# Patient Record
Sex: FEMALE
[De-identification: names, ages, dates, MRNs, and addresses within clinical notes are randomized per-mention and may not be internally consistent; named-entity substitution may affect disease eponyms.]

---

## 2021-01-26 ENCOUNTER — APPOINTMENT (OUTPATIENT)
Dept: INTERNAL MEDICINE | Facility: CLINIC | Age: 46
End: 2021-01-26
Payer: MEDICAID

## 2021-01-26 ENCOUNTER — NON-APPOINTMENT (OUTPATIENT)
Age: 46
End: 2021-01-26

## 2021-01-26 VITALS
TEMPERATURE: 98.4 F | WEIGHT: 122 LBS | DIASTOLIC BLOOD PRESSURE: 74 MMHG | SYSTOLIC BLOOD PRESSURE: 113 MMHG | RESPIRATION RATE: 15 BRPM | BODY MASS INDEX: 20.83 KG/M2 | OXYGEN SATURATION: 98 % | HEIGHT: 64 IN | HEART RATE: 78 BPM

## 2021-01-26 DIAGNOSIS — F17.200 NICOTINE DEPENDENCE, UNSPECIFIED, UNCOMPLICATED: ICD-10-CM

## 2021-01-26 PROCEDURE — 99396 PREV VISIT EST AGE 40-64: CPT | Mod: 25

## 2021-01-26 PROCEDURE — 93000 ELECTROCARDIOGRAM COMPLETE: CPT

## 2021-01-26 PROCEDURE — 99072 ADDL SUPL MATRL&STAF TM PHE: CPT

## 2021-01-27 PROBLEM — F17.200 CURRENT EVERY DAY SMOKER: Status: ACTIVE | Noted: 2021-01-26

## 2021-01-27 NOTE — HEALTH RISK ASSESSMENT
[Good] : ~his/her~  mood as  good [] : Yes [Yes] : Yes [2 - 4 times a month (2 pts)] : 2-4 times a month (2 points) [1 or 2 (0 pts)] : 1 or 2 (0 points) [Never (0 pts)] : Never (0 points) [No] : In the past 12 months have you used drugs other than those required for medical reasons? No [No falls in past year] : Patient reported no falls in the past year [0] : 2) Feeling down, depressed, or hopeless: Not at all (0) [Patient reported mammogram was normal] : Patient reported mammogram was normal [Patient reported PAP Smear was normal] : Patient reported PAP Smear was normal [de-identified] : one PPD smoker [de-identified] : Social alcohol use [Audit-CScore] : 2 [de-identified] : lightly active [de-identified] : regular [[id number]] : 0 [MammogramDate] : 02/2020 [MammogramComments] : Mammogram done in [place] [PapSmearDate] : 2020

## 2021-01-27 NOTE — PLAN
[FreeTextEntry1] : Blood tests sent to the lab\par \par EKG\par \par Patient had mammogram done in [place]

## 2021-01-27 NOTE — HISTORY OF PRESENT ILLNESS
[FreeTextEntry1] : Physical examination  [de-identified] : GAMALIEL KRAUSE is a 45 year old female patient who presents today for annual physical examination. She is feeling well and offers no complaints.

## 2021-01-27 NOTE — END OF VISIT
[FreeTextEntry3] : I, Pat Lucas, personally transcribed these services in the presence of Dr. Isabella Sevilla MD. I, Dr. Isabella Sevilla MD, personally performed the services described in this documentation as scribed by Pat Lucas in my presence and it is both accurate and complete.

## 2021-01-27 NOTE — PHYSICAL EXAM
[No Acute Distress] : no acute distress [Well Nourished] : well nourished [Well Developed] : well developed [Well-Appearing] : well-appearing [Normal Sclera/Conjunctiva] : normal sclera/conjunctiva [PERRL] : pupils equal round and reactive to light [EOMI] : extraocular movements intact [Normal Outer Ear/Nose] : the outer ears and nose were normal in appearance [Normal Oropharynx] : the oropharynx was normal [No JVD] : no jugular venous distention [No Lymphadenopathy] : no lymphadenopathy [Supple] : supple [Thyroid Normal, No Nodules] : the thyroid was normal and there were no nodules present [No Respiratory Distress] : no respiratory distress  [No Accessory Muscle Use] : no accessory muscle use [Clear to Auscultation] : lungs were clear to auscultation bilaterally [Normal Rate] : normal rate  [Regular Rhythm] : with a regular rhythm [Normal S1, S2] : normal S1 and S2 [No Murmur] : no murmur heard [No Carotid Bruits] : no carotid bruits [No Abdominal Bruit] : a ~M bruit was not heard ~T in the abdomen [No Varicosities] : no varicosities [Pedal Pulses Present] : the pedal pulses are present [No Edema] : there was no peripheral edema [No Palpable Aorta] : no palpable aorta [No Extremity Clubbing/Cyanosis] : no extremity clubbing/cyanosis [Normal Appearance] : normal in appearance [No Nipple Discharge] : no nipple discharge [No Axillary Lymphadenopathy] : no axillary lymphadenopathy [Soft] : abdomen soft [Non Tender] : non-tender [Non-distended] : non-distended [No Masses] : no abdominal mass palpated [No HSM] : no HSM [Normal Bowel Sounds] : normal bowel sounds [Normal Posterior Cervical Nodes] : no posterior cervical lymphadenopathy [Normal Anterior Cervical Nodes] : no anterior cervical lymphadenopathy [No CVA Tenderness] : no CVA  tenderness [No Spinal Tenderness] : no spinal tenderness [No Joint Swelling] : no joint swelling [Grossly Normal Strength/Tone] : grossly normal strength/tone [No Rash] : no rash [Coordination Grossly Intact] : coordination grossly intact [No Focal Deficits] : no focal deficits [Normal Gait] : normal gait [Deep Tendon Reflexes (DTR)] : deep tendon reflexes were 2+ and symmetric [Normal Affect] : the affect was normal [Normal Insight/Judgement] : insight and judgment were intact [FreeTextEntry1] : n/a

## 2021-02-01 LAB
25(OH)D3 SERPL-MCNC: 15.1 NG/ML
ALBUMIN SERPL ELPH-MCNC: 4.8 G/DL
ALP BLD-CCNC: 70 U/L
ALT SERPL-CCNC: 12 U/L
ANION GAP SERPL CALC-SCNC: 13 MMOL/L
APPEARANCE: CLEAR
AST SERPL-CCNC: 17 U/L
BACTERIA: NEGATIVE
BASOPHILS # BLD AUTO: 0.06 K/UL
BASOPHILS NFR BLD AUTO: 0.8 %
BILIRUB SERPL-MCNC: 0.8 MG/DL
BILIRUBIN URINE: NEGATIVE
BLOOD URINE: ABNORMAL
BUN SERPL-MCNC: 13 MG/DL
CALCIUM SERPL-MCNC: 9.8 MG/DL
CHLORIDE SERPL-SCNC: 105 MMOL/L
CO2 SERPL-SCNC: 26 MMOL/L
COLOR: YELLOW
CREAT SERPL-MCNC: 0.65 MG/DL
EOSINOPHIL # BLD AUTO: 0.14 K/UL
EOSINOPHIL NFR BLD AUTO: 1.9 %
GLUCOSE QUALITATIVE U: NEGATIVE
GLUCOSE SERPL-MCNC: 70 MG/DL
HCT VFR BLD CALC: 36.2 %
HGB BLD-MCNC: 11.1 G/DL
HYALINE CASTS: 1 /LPF
IMM GRANULOCYTES NFR BLD AUTO: 0.3 %
KETONES URINE: NEGATIVE
LEUKOCYTE ESTERASE URINE: NEGATIVE
LYMPHOCYTES # BLD AUTO: 1.9 K/UL
LYMPHOCYTES NFR BLD AUTO: 26.2 %
MAN DIFF?: NORMAL
MCHC RBC-ENTMCNC: 22.6 PG
MCHC RBC-ENTMCNC: 30.7 GM/DL
MCV RBC AUTO: 73.7 FL
MICROSCOPIC-UA: NORMAL
MONOCYTES # BLD AUTO: 0.63 K/UL
MONOCYTES NFR BLD AUTO: 8.7 %
NEUTROPHILS # BLD AUTO: 4.5 K/UL
NEUTROPHILS NFR BLD AUTO: 62.1 %
NITRITE URINE: NEGATIVE
PH URINE: 7
PLATELET # BLD AUTO: 334 K/UL
POTASSIUM SERPL-SCNC: 4.4 MMOL/L
PROT SERPL-MCNC: 7.1 G/DL
PROTEIN URINE: NORMAL
RBC # BLD: 4.91 M/UL
RBC # FLD: 16.3 %
RED BLOOD CELLS URINE: 9 /HPF
SODIUM SERPL-SCNC: 144 MMOL/L
SPECIFIC GRAVITY URINE: 1.02
SQUAMOUS EPITHELIAL CELLS: 3 /HPF
T3 SERPL-MCNC: 99 NG/DL
T4 FREE SERPL-MCNC: 1.1 NG/DL
T4 SERPL-MCNC: 6.5 UG/DL
TSH SERPL-ACNC: 1.46 UIU/ML
UROBILINOGEN URINE: NORMAL
WBC # FLD AUTO: 7.25 K/UL
WHITE BLOOD CELLS URINE: 2 /HPF

## 2021-02-22 LAB
CHOLEST SERPL-MCNC: 225 MG/DL
HDLC SERPL-MCNC: 63 MG/DL
LDLC SERPL CALC-MCNC: 124 MG/DL
NONHDLC SERPL-MCNC: 162 MG/DL
TRIGL SERPL-MCNC: 192 MG/DL

## 2021-10-19 DIAGNOSIS — R92.2 INCONCLUSIVE MAMMOGRAM: ICD-10-CM

## 2021-11-08 ENCOUNTER — APPOINTMENT (OUTPATIENT)
Dept: INTERNAL MEDICINE | Facility: CLINIC | Age: 46
End: 2021-11-08
Payer: MEDICAID

## 2021-11-08 VITALS
WEIGHT: 128 LBS | HEIGHT: 64 IN | RESPIRATION RATE: 14 BRPM | TEMPERATURE: 99.1 F | DIASTOLIC BLOOD PRESSURE: 66 MMHG | HEART RATE: 62 BPM | SYSTOLIC BLOOD PRESSURE: 104 MMHG | BODY MASS INDEX: 21.85 KG/M2 | OXYGEN SATURATION: 98 %

## 2021-11-08 DIAGNOSIS — F17.200 NICOTINE DEPENDENCE, UNSPECIFIED, UNCOMPLICATED: ICD-10-CM

## 2021-11-08 PROCEDURE — 99213 OFFICE O/P EST LOW 20 MIN: CPT | Mod: 25

## 2021-11-08 PROCEDURE — 99072 ADDL SUPL MATRL&STAF TM PHE: CPT

## 2021-11-12 NOTE — HEALTH RISK ASSESSMENT
[] : Yes [Yes] : Yes [2 - 4 times a month (2 pts)] : 2-4 times a month (2 points) [1 or 2 (0 pts)] : 1 or 2 (0 points) [Never (0 pts)] : Never (0 points) [No] : In the past 12 months have you used drugs other than those required for medical reasons? No [No falls in past year] : Patient reported no falls in the past year [0] : 2) Feeling down, depressed, or hopeless: Not at all (0) [de-identified] : one PPD smoker [de-identified] : Social alcohol use [Audit-CScore] : 2 [de-identified] : lightly active [de-identified] : regular [[id number]] : 0

## 2021-11-12 NOTE — HISTORY OF PRESENT ILLNESS
[FreeTextEntry1] : Follow-up microscopic hematuria, anemia [de-identified] : GAMALIEL KRAUSE is a 46 year old female with a PMHx of vitamin D deficiency and anemia who presents today for follow-up. She is feeling okay and offers no specific complaints.

## 2021-11-12 NOTE — END OF VISIT
[FreeTextEntry3] : I, Joycelyn Guillen, personally transcribed these services in the presence of Dr. Isabella Sevilla MD. I, Dr. Isabella Sevilla MD, personally performed the services described in this documentation as scribed by Joycelyn Guillen in my presence and it is both accurate and complete.

## 2021-12-12 LAB
25(OH)D3 SERPL-MCNC: 23.8 NG/ML
ALBUMIN SERPL ELPH-MCNC: 5 G/DL
ALP BLD-CCNC: 73 U/L
ALT SERPL-CCNC: 11 U/L
ANION GAP SERPL CALC-SCNC: 10 MMOL/L
APPEARANCE: CLEAR
AST SERPL-CCNC: 18 U/L
BACTERIA UR CULT: NORMAL
BACTERIA: NEGATIVE
BILIRUB SERPL-MCNC: 0.9 MG/DL
BILIRUBIN URINE: NEGATIVE
BLOOD URINE: ABNORMAL
BUN SERPL-MCNC: 12 MG/DL
CALCIUM SERPL-MCNC: 10 MG/DL
CHLORIDE SERPL-SCNC: 104 MMOL/L
CHOLEST SERPL-MCNC: 239 MG/DL
CO2 SERPL-SCNC: 26 MMOL/L
COLOR: YELLOW
CREAT SERPL-MCNC: 0.59 MG/DL
GLUCOSE QUALITATIVE U: NEGATIVE
GLUCOSE SERPL-MCNC: 103 MG/DL
HDLC SERPL-MCNC: 80 MG/DL
HYALINE CASTS: 0 /LPF
KETONES URINE: NEGATIVE
LDLC SERPL CALC-MCNC: 138 MG/DL
LEUKOCYTE ESTERASE URINE: NEGATIVE
MICROSCOPIC-UA: NORMAL
NITRITE URINE: NEGATIVE
NONHDLC SERPL-MCNC: 159 MG/DL
PH URINE: 6.5
POTASSIUM SERPL-SCNC: 4 MMOL/L
PROT SERPL-MCNC: 7.7 G/DL
PROTEIN URINE: NEGATIVE
RED BLOOD CELLS URINE: 24 /HPF
SODIUM SERPL-SCNC: 140 MMOL/L
SPECIFIC GRAVITY URINE: 1.02
SQUAMOUS EPITHELIAL CELLS: 2 /HPF
T3 SERPL-MCNC: 102 NG/DL
T4 FREE SERPL-MCNC: 1.2 NG/DL
T4 SERPL-MCNC: 6.5 UG/DL
TRIGL SERPL-MCNC: 107 MG/DL
TSH SERPL-ACNC: 2.29 UIU/ML
URINE CYTOLOGY: NORMAL
UROBILINOGEN URINE: NORMAL
WHITE BLOOD CELLS URINE: 1 /HPF

## 2022-01-21 LAB
BASOPHILS # BLD AUTO: 0.06 K/UL
BASOPHILS NFR BLD AUTO: 0.8 %
EOSINOPHIL # BLD AUTO: 0.12 K/UL
EOSINOPHIL NFR BLD AUTO: 1.7 %
HCT VFR BLD CALC: 34.7 %
HGB BLD-MCNC: 10.6 G/DL
IMM GRANULOCYTES NFR BLD AUTO: 0.3 %
LYMPHOCYTES # BLD AUTO: 2.24 K/UL
LYMPHOCYTES NFR BLD AUTO: 31 %
MAN DIFF?: NORMAL
MCHC RBC-ENTMCNC: 21.9 PG
MCHC RBC-ENTMCNC: 30.5 GM/DL
MCV RBC AUTO: 71.8 FL
MONOCYTES # BLD AUTO: 0.63 K/UL
MONOCYTES NFR BLD AUTO: 8.7 %
NEUTROPHILS # BLD AUTO: 4.15 K/UL
NEUTROPHILS NFR BLD AUTO: 57.5 %
PLATELET # BLD AUTO: 327 K/UL
RBC # BLD: 4.83 M/UL
RBC # FLD: 15.9 %
WBC # FLD AUTO: 7.22 K/UL

## 2022-06-23 ENCOUNTER — APPOINTMENT (OUTPATIENT)
Dept: INTERNAL MEDICINE | Facility: CLINIC | Age: 47
End: 2022-06-23
Payer: MEDICAID

## 2022-06-23 ENCOUNTER — NON-APPOINTMENT (OUTPATIENT)
Age: 47
End: 2022-06-23

## 2022-06-23 VITALS
DIASTOLIC BLOOD PRESSURE: 80 MMHG | WEIGHT: 123 LBS | HEIGHT: 64 IN | OXYGEN SATURATION: 97 % | TEMPERATURE: 97.9 F | SYSTOLIC BLOOD PRESSURE: 122 MMHG | RESPIRATION RATE: 16 BRPM | BODY MASS INDEX: 21 KG/M2 | HEART RATE: 64 BPM

## 2022-06-23 DIAGNOSIS — R82.90 UNSPECIFIED ABNORMAL FINDINGS IN URINE: ICD-10-CM

## 2022-06-23 PROCEDURE — 93000 ELECTROCARDIOGRAM COMPLETE: CPT

## 2022-06-23 PROCEDURE — 99396 PREV VISIT EST AGE 40-64: CPT | Mod: 25

## 2022-06-24 ENCOUNTER — OUTPATIENT (OUTPATIENT)
Dept: OUTPATIENT SERVICES | Facility: HOSPITAL | Age: 47
LOS: 1 days | End: 2022-06-24
Payer: COMMERCIAL

## 2022-06-24 ENCOUNTER — APPOINTMENT (OUTPATIENT)
Dept: OBGYN | Facility: CLINIC | Age: 47
End: 2022-06-24
Payer: MEDICAID

## 2022-06-24 VITALS
BODY MASS INDEX: 21.51 KG/M2 | HEART RATE: 62 BPM | SYSTOLIC BLOOD PRESSURE: 97 MMHG | HEIGHT: 64 IN | OXYGEN SATURATION: 95 % | DIASTOLIC BLOOD PRESSURE: 62 MMHG | WEIGHT: 126 LBS | TEMPERATURE: 97 F | RESPIRATION RATE: 18 BRPM

## 2022-06-24 DIAGNOSIS — Z00.00 ENCOUNTER FOR GENERAL ADULT MEDICAL EXAMINATION WITHOUT ABNORMAL FINDINGS: ICD-10-CM

## 2022-06-24 DIAGNOSIS — Z01.419 ENCOUNTER FOR GYNECOLOGICAL EXAMINATION (GENERAL) (ROUTINE) W/OUT ABNORMAL FINDINGS: ICD-10-CM

## 2022-06-24 PROCEDURE — 99386 PREV VISIT NEW AGE 40-64: CPT

## 2022-06-24 PROCEDURE — 87491 CHLMYD TRACH DNA AMP PROBE: CPT

## 2022-06-24 PROCEDURE — 87591 N.GONORRHOEAE DNA AMP PROB: CPT

## 2022-06-24 PROCEDURE — 87625 HPV TYPES 16 & 18 ONLY: CPT

## 2022-06-24 PROCEDURE — G0463: CPT

## 2022-06-24 PROCEDURE — 87624 HPV HI-RISK TYP POOLED RSLT: CPT

## 2022-06-24 NOTE — HISTORY OF PRESENT ILLNESS
[Patient reported mammogram was normal] : Patient reported mammogram was normal [N] : Patient does not use contraception [Y] : Positive pregnancy history [FreeTextEntry1] : Patient is here for gyn check up\par No c/o\par \par \par LMP 22\par .\par last pap 2yrs ago.\par Mammo  nl per pt.\par Denies h/o gyn problems. [Mammogramdate] : 6/2022 [PapSmeardate] : 2 yrs ago as per pt [LMPDate] : 6/17/22 [PGxTotal] : 1 [PGHxFullTerm] : 0 [PGHxPremature] : 0 [PGHxAbortions] : 1 [[facility]] : 0 [PGHxABInduced] : 1 [PGHxABSpont] : 0 [PGHxEctopic] : 0 [PGHxMultBirths] : 0

## 2022-06-26 ENCOUNTER — NON-APPOINTMENT (OUTPATIENT)
Age: 47
End: 2022-06-26

## 2022-06-26 NOTE — PHYSICAL EXAM
[No Acute Distress] : no acute distress [Well Nourished] : well nourished [Well Developed] : well developed [Well-Appearing] : well-appearing [Normal Sclera/Conjunctiva] : normal sclera/conjunctiva [PERRL] : pupils equal round and reactive to light [EOMI] : extraocular movements intact [Normal Outer Ear/Nose] : the outer ears and nose were normal in appearance [Normal Oropharynx] : the oropharynx was normal [No JVD] : no jugular venous distention [No Lymphadenopathy] : no lymphadenopathy [Supple] : supple [Thyroid Normal, No Nodules] : the thyroid was normal and there were no nodules present [No Respiratory Distress] : no respiratory distress  [No Accessory Muscle Use] : no accessory muscle use [Clear to Auscultation] : lungs were clear to auscultation bilaterally [Normal Rate] : normal rate  [Regular Rhythm] : with a regular rhythm [Normal S1, S2] : normal S1 and S2 [No Murmur] : no murmur heard [No Carotid Bruits] : no carotid bruits [No Abdominal Bruit] : a ~M bruit was not heard ~T in the abdomen [No Varicosities] : no varicosities [Pedal Pulses Present] : the pedal pulses are present [No Edema] : there was no peripheral edema [No Palpable Aorta] : no palpable aorta [No Extremity Clubbing/Cyanosis] : no extremity clubbing/cyanosis [Soft] : abdomen soft [Non Tender] : non-tender [Non-distended] : non-distended [No HSM] : no HSM [Normal Bowel Sounds] : normal bowel sounds [Normal Posterior Cervical Nodes] : no posterior cervical lymphadenopathy [Normal Anterior Cervical Nodes] : no anterior cervical lymphadenopathy [No CVA Tenderness] : no CVA  tenderness [No Spinal Tenderness] : no spinal tenderness [No Joint Swelling] : no joint swelling [Grossly Normal Strength/Tone] : grossly normal strength/tone [No Rash] : no rash [Coordination Grossly Intact] : coordination grossly intact [No Focal Deficits] : no focal deficits [Normal Gait] : normal gait [Deep Tendon Reflexes (DTR)] : deep tendon reflexes were 2+ and symmetric [Normal Affect] : the affect was normal [Normal Insight/Judgement] : insight and judgment were intact [Normal Appearance] : normal in appearance [No Masses] : no palpable masses [No Nipple Discharge] : no nipple discharge [No Axillary Lymphadenopathy] : no axillary lymphadenopathy [FreeTextEntry1] : N/A

## 2022-06-26 NOTE — HISTORY OF PRESENT ILLNESS
[FreeTextEntry1] : Physical examination  [de-identified] : GAMALIEL KRAUSE is a 47 year old female with a PMHx of anemia, thalassemia trait, and vitamin D deficiency who presents today for physical

## 2022-06-26 NOTE — HEALTH RISK ASSESSMENT
[Good] : ~his/her~  mood as  good [Current] : Current [Yes] : Yes [2 - 4 times a month (2 pts)] : 2-4 times a month (2 points) [1 or 2 (0 pts)] : 1 or 2 (0 points) [Never (0 pts)] : Never (0 points) [No] : In the past 12 months have you used drugs other than those required for medical reasons? No [No falls in past year] : Patient reported no falls in the past year [0] : 2) Feeling down, depressed, or hopeless: Not at all (0) [de-identified] : one PPD smoker [de-identified] : Social alcohol use [Audit-CScore] : 2 [de-identified] : lightly active [de-identified] : regular [[id number]] : 0 [MammogramDate] : 6/20/2022 [PapSmearDate] : 2-3 years ago

## 2022-06-26 NOTE — END OF VISIT
[FreeTextEntry3] : I, Hilda Wright, personally transcribed these services in the presence of Dr. Isabella Sevilla MD. I, Dr. Isabella Sevilla MD, personally performed the services described in this documentation as scribed by Hilda Wright in my presence and it is both accurate and complete.

## 2022-06-27 DIAGNOSIS — Z01.419 ENCOUNTER FOR GYNECOLOGICAL EXAMINATION (GENERAL) (ROUTINE) WITHOUT ABNORMAL FINDINGS: ICD-10-CM

## 2022-06-30 LAB
C TRACH RRNA SPEC QL NAA+PROBE: NOT DETECTED
CYTOLOGY CVX/VAG DOC THIN PREP: NORMAL
HPV 16 E6+E7 MRNA CVX QL NAA+PROBE: NOT DETECTED
HPV HIGH+LOW RISK DNA PNL CVX: DETECTED
HPV18+45 E6+E7 MRNA CVX QL NAA+PROBE: NOT DETECTED
N GONORRHOEA RRNA SPEC QL NAA+PROBE: NOT DETECTED
SOURCE TP AMPLIFICATION: NORMAL

## 2022-07-07 ENCOUNTER — OUTPATIENT (OUTPATIENT)
Dept: OUTPATIENT SERVICES | Facility: HOSPITAL | Age: 47
LOS: 1 days | End: 2022-07-07
Payer: COMMERCIAL

## 2022-07-07 ENCOUNTER — APPOINTMENT (OUTPATIENT)
Dept: OBGYN | Facility: CLINIC | Age: 47
End: 2022-07-07

## 2022-07-07 VITALS
OXYGEN SATURATION: 99 % | DIASTOLIC BLOOD PRESSURE: 69 MMHG | SYSTOLIC BLOOD PRESSURE: 106 MMHG | WEIGHT: 126 LBS | HEART RATE: 55 BPM | TEMPERATURE: 97.3 F | RESPIRATION RATE: 18 BRPM | BODY MASS INDEX: 21.51 KG/M2 | HEIGHT: 64 IN

## 2022-07-07 DIAGNOSIS — Z00.00 ENCOUNTER FOR GENERAL ADULT MEDICAL EXAMINATION WITHOUT ABNORMAL FINDINGS: ICD-10-CM

## 2022-07-07 PROCEDURE — G0463: CPT

## 2022-07-07 PROCEDURE — 99213 OFFICE O/P EST LOW 20 MIN: CPT

## 2022-07-12 DIAGNOSIS — R87.810 CERVICAL HIGH RISK HUMAN PAPILLOMAVIRUS (HPV) DNA TEST POSITIVE: ICD-10-CM

## 2022-11-01 LAB
25(OH)D3 SERPL-MCNC: 28 NG/ML
ALBUMIN SERPL ELPH-MCNC: 5.2 G/DL
ALP BLD-CCNC: 71 U/L
ALT SERPL-CCNC: 11 U/L
ANION GAP SERPL CALC-SCNC: 14 MMOL/L
APPEARANCE: CLEAR
AST SERPL-CCNC: 16 U/L
BACTERIA: NEGATIVE
BASOPHILS # BLD AUTO: 0.06 K/UL
BASOPHILS NFR BLD AUTO: 1 %
BILIRUB SERPL-MCNC: 1.2 MG/DL
BILIRUBIN URINE: NEGATIVE
BLOOD URINE: ABNORMAL
BUN SERPL-MCNC: 9 MG/DL
CALCIUM SERPL-MCNC: 9.8 MG/DL
CHLORIDE SERPL-SCNC: 104 MMOL/L
CHOLEST SERPL-MCNC: 236 MG/DL
CO2 SERPL-SCNC: 24 MMOL/L
COLOR: YELLOW
CREAT SERPL-MCNC: 0.54 MG/DL
EGFR: 114 ML/MIN/1.73M2
EOSINOPHIL # BLD AUTO: 0.12 K/UL
EOSINOPHIL NFR BLD AUTO: 2 %
GLUCOSE QUALITATIVE U: NEGATIVE
GLUCOSE SERPL-MCNC: 87 MG/DL
HCT VFR BLD CALC: 35.1 %
HDLC SERPL-MCNC: 73 MG/DL
HGB BLD-MCNC: 10.8 G/DL
HYALINE CASTS: 1 /LPF
IMM GRANULOCYTES NFR BLD AUTO: 0.3 %
KETONES URINE: NORMAL
LDLC SERPL CALC-MCNC: 148 MG/DL
LEUKOCYTE ESTERASE URINE: NEGATIVE
LYMPHOCYTES # BLD AUTO: 2.1 K/UL
LYMPHOCYTES NFR BLD AUTO: 34.8 %
MAN DIFF?: NORMAL
MCHC RBC-ENTMCNC: 22.3 PG
MCHC RBC-ENTMCNC: 30.8 GM/DL
MCV RBC AUTO: 72.4 FL
MICROSCOPIC-UA: NORMAL
MONOCYTES # BLD AUTO: 0.57 K/UL
MONOCYTES NFR BLD AUTO: 9.5 %
NEUTROPHILS # BLD AUTO: 3.16 K/UL
NEUTROPHILS NFR BLD AUTO: 52.4 %
NITRITE URINE: NEGATIVE
NONHDLC SERPL-MCNC: 163 MG/DL
PH URINE: 6
PLATELET # BLD AUTO: 296 K/UL
POTASSIUM SERPL-SCNC: 4.5 MMOL/L
PROT SERPL-MCNC: 7.7 G/DL
PROTEIN URINE: NEGATIVE
RBC # BLD: 4.85 M/UL
RBC # FLD: 17.7 %
RED BLOOD CELLS URINE: 32 /HPF
SODIUM SERPL-SCNC: 142 MMOL/L
SPECIFIC GRAVITY URINE: 1.02
SQUAMOUS EPITHELIAL CELLS: 0 /HPF
TRIGL SERPL-MCNC: 75 MG/DL
UROBILINOGEN URINE: NORMAL
WBC # FLD AUTO: 6.03 K/UL
WHITE BLOOD CELLS URINE: 1 /HPF

## 2022-11-03 ENCOUNTER — APPOINTMENT (OUTPATIENT)
Dept: INTERNAL MEDICINE | Facility: CLINIC | Age: 47
End: 2022-11-03

## 2022-11-03 VITALS
WEIGHT: 124 LBS | SYSTOLIC BLOOD PRESSURE: 104 MMHG | OXYGEN SATURATION: 98 % | RESPIRATION RATE: 16 BRPM | HEIGHT: 64 IN | DIASTOLIC BLOOD PRESSURE: 67 MMHG | TEMPERATURE: 97.6 F | BODY MASS INDEX: 21.17 KG/M2 | HEART RATE: 66 BPM

## 2022-11-03 DIAGNOSIS — R87.810 CERVICAL HIGH RISK HUMAN PAPILLOMAVIRUS (HPV) DNA TEST POSITIVE: ICD-10-CM

## 2022-11-03 PROCEDURE — 36415 COLL VENOUS BLD VENIPUNCTURE: CPT

## 2022-11-03 PROCEDURE — 99214 OFFICE O/P EST MOD 30 MIN: CPT | Mod: 25

## 2022-11-06 NOTE — PHYSICAL EXAM
[No Acute Distress] : no acute distress [Well Nourished] : well nourished [Well Developed] : well developed [Well-Appearing] : well-appearing [Normal Sclera/Conjunctiva] : normal sclera/conjunctiva [PERRL] : pupils equal round and reactive to light [EOMI] : extraocular movements intact [Normal Outer Ear/Nose] : the outer ears and nose were normal in appearance [Normal Oropharynx] : the oropharynx was normal [No JVD] : no jugular venous distention [No Lymphadenopathy] : no lymphadenopathy [Supple] : supple [Thyroid Normal, No Nodules] : the thyroid was normal and there were no nodules present [No Respiratory Distress] : no respiratory distress  [No Accessory Muscle Use] : no accessory muscle use [Clear to Auscultation] : lungs were clear to auscultation bilaterally [Normal Rate] : normal rate  [Regular Rhythm] : with a regular rhythm [Normal S1, S2] : normal S1 and S2 [No Murmur] : no murmur heard [No Carotid Bruits] : no carotid bruits [No Abdominal Bruit] : a ~M bruit was not heard ~T in the abdomen [No Varicosities] : no varicosities [Pedal Pulses Present] : the pedal pulses are present [No Edema] : there was no peripheral edema [No Palpable Aorta] : no palpable aorta [No Extremity Clubbing/Cyanosis] : no extremity clubbing/cyanosis [Soft] : abdomen soft [Non Tender] : non-tender [Non-distended] : non-distended [No Masses] : no abdominal mass palpated [No HSM] : no HSM [Normal Bowel Sounds] : normal bowel sounds [Normal Posterior Cervical Nodes] : no posterior cervical lymphadenopathy [Normal Anterior Cervical Nodes] : no anterior cervical lymphadenopathy [No CVA Tenderness] : no CVA  tenderness [No Spinal Tenderness] : no spinal tenderness [No Joint Swelling] : no joint swelling [Grossly Normal Strength/Tone] : grossly normal strength/tone [No Rash] : no rash [Coordination Grossly Intact] : coordination grossly intact [No Focal Deficits] : no focal deficits [Normal Gait] : normal gait [Deep Tendon Reflexes (DTR)] : deep tendon reflexes were 2+ and symmetric [Normal Affect] : the affect was normal [Normal Insight/Judgement] : insight and judgment were intact [de-identified] : deferred

## 2022-11-06 NOTE — HISTORY OF PRESENT ILLNESS
[de-identified] : GAMALIEL KRAUSE is a 47 year old female with a PMHx of thalassemia trait, anemia, and vitamin D deficiency who presents today for follow-up. \par \par Pt returned from [place] a week ago . Pt was found to have HLD , mirccopic hematuria last visit. CT scan of chest  showed mild centrilobular emphysema. Very small nodules of ground-glass opacity central and peripheral zones primarily of the upper lung zones most supportive of respiratory bronchiolitis rather than DIP. Largest nodule up to 0.4 cm. F/u CT at 3-6 months can be obtained to guidelines\par \par She reports FamHx of  Alport syndrome [FreeTextEntry1] : follow-up

## 2022-11-06 NOTE — PLAN
[FreeTextEntry1] : CT scan abdomen and Pelvis w & w/o CONTRAST \par \par Blood tests and urine sent to the lab\par \par Repeat CT chest in 3-6 months

## 2022-11-06 NOTE — HEALTH RISK ASSESSMENT
[Current] : Current [Yes] : Yes [2 - 4 times a month (2 pts)] : 2-4 times a month (2 points) [1 or 2 (0 pts)] : 1 or 2 (0 points) [Never (0 pts)] : Never (0 points) [No] : In the past 12 months have you used drugs other than those required for medical reasons? No [No falls in past year] : Patient reported no falls in the past year [0] : 2) Feeling down, depressed, or hopeless: Not at all (0) [Good] : ~his/her~  mood as  good [de-identified] : one PPD smoker [de-identified] : Social alcohol use [de-identified] : lightly active [Audit-CScore] : 2 [de-identified] : regular [[id number]] : 0

## 2022-11-15 LAB
ALBUMIN SERPL ELPH-MCNC: 4.3 G/DL
ALP BLD-CCNC: 65 U/L
ALT SERPL-CCNC: 11 U/L
ANION GAP SERPL CALC-SCNC: 8 MMOL/L
APPEARANCE: CLEAR
AST SERPL-CCNC: 18 U/L
BACTERIA UR CULT: NORMAL
BACTERIA: NEGATIVE
BASOPHILS # BLD AUTO: 0.05 K/UL
BASOPHILS NFR BLD AUTO: 1 %
BILIRUB SERPL-MCNC: 0.7 MG/DL
BILIRUBIN URINE: NEGATIVE
BLOOD URINE: ABNORMAL
BUN SERPL-MCNC: 11 MG/DL
CALCIUM SERPL-MCNC: 9.1 MG/DL
CHLORIDE SERPL-SCNC: 106 MMOL/L
CHOLEST SERPL-MCNC: 212 MG/DL
CO2 SERPL-SCNC: 26 MMOL/L
COLOR: YELLOW
CREAT SERPL-MCNC: 0.51 MG/DL
EGFR: 116 ML/MIN/1.73M2
EOSINOPHIL # BLD AUTO: 0.12 K/UL
EOSINOPHIL NFR BLD AUTO: 2.3 %
ESTIMATED AVERAGE GLUCOSE: 97 MG/DL
GLUCOSE QUALITATIVE U: NEGATIVE
GLUCOSE SERPL-MCNC: 77 MG/DL
HBA1C MFR BLD HPLC: 5 %
HCT VFR BLD CALC: 31.2 %
HDLC SERPL-MCNC: 57 MG/DL
HGB BLD-MCNC: 9.8 G/DL
HYALINE CASTS: 0 /LPF
IMM GRANULOCYTES NFR BLD AUTO: 0.2 %
KETONES URINE: NEGATIVE
LDLC SERPL CALC-MCNC: 135 MG/DL
LEUKOCYTE ESTERASE URINE: NEGATIVE
LYMPHOCYTES # BLD AUTO: 1.55 K/UL
LYMPHOCYTES NFR BLD AUTO: 29.5 %
MAN DIFF?: NORMAL
MCHC RBC-ENTMCNC: 22.7 PG
MCHC RBC-ENTMCNC: 31.4 GM/DL
MCV RBC AUTO: 72.4 FL
MICROSCOPIC-UA: NORMAL
MONOCYTES # BLD AUTO: 0.6 K/UL
MONOCYTES NFR BLD AUTO: 11.4 %
NEUTROPHILS # BLD AUTO: 2.93 K/UL
NEUTROPHILS NFR BLD AUTO: 55.6 %
NITRITE URINE: NEGATIVE
NONHDLC SERPL-MCNC: 156 MG/DL
PH URINE: 6
PLATELET # BLD AUTO: 289 K/UL
POTASSIUM SERPL-SCNC: 4.4 MMOL/L
PROT SERPL-MCNC: 6.7 G/DL
PROTEIN URINE: NORMAL
RBC # BLD: 4.31 M/UL
RBC # FLD: 16.3 %
RED BLOOD CELLS URINE: 3 /HPF
SODIUM SERPL-SCNC: 140 MMOL/L
SPECIFIC GRAVITY URINE: 1.03
SQUAMOUS EPITHELIAL CELLS: 10 /HPF
T3 SERPL-MCNC: 79 NG/DL
T4 FREE SERPL-MCNC: 0.9 NG/DL
T4 SERPL-MCNC: 5.8 UG/DL
TRIGL SERPL-MCNC: 105 MG/DL
TSH SERPL-ACNC: 1.44 UIU/ML
URINE CYTOLOGY: NORMAL
UROBILINOGEN URINE: NORMAL
WBC # FLD AUTO: 5.26 K/UL
WHITE BLOOD CELLS URINE: 0 /HPF

## 2022-11-23 ENCOUNTER — APPOINTMENT (OUTPATIENT)
Dept: INTERNAL MEDICINE | Facility: CLINIC | Age: 47
End: 2022-11-23
Payer: MEDICAID

## 2022-11-23 VITALS
RESPIRATION RATE: 14 BRPM | HEART RATE: 76 BPM | TEMPERATURE: 98.1 F | SYSTOLIC BLOOD PRESSURE: 108 MMHG | DIASTOLIC BLOOD PRESSURE: 60 MMHG | OXYGEN SATURATION: 98 %

## 2022-11-23 DIAGNOSIS — M54.9 DORSALGIA, UNSPECIFIED: ICD-10-CM

## 2022-11-23 PROCEDURE — 99214 OFFICE O/P EST MOD 30 MIN: CPT

## 2022-11-25 ENCOUNTER — TRANSCRIPTION ENCOUNTER (OUTPATIENT)
Age: 47
End: 2022-11-25

## 2022-11-25 NOTE — HEALTH RISK ASSESSMENT
[Current] : Current [Yes] : Yes [2 - 4 times a month (2 pts)] : 2-4 times a month (2 points) [1 or 2 (0 pts)] : 1 or 2 (0 points) [Never (0 pts)] : Never (0 points) [No] : In the past 12 months have you used drugs other than those required for medical reasons? No [No falls in past year] : Patient reported no falls in the past year [0] : 2) Feeling down, depressed, or hopeless: Not at all (0) [de-identified] : one PPD smoker [de-identified] : Social alcohol use [Audit-CScore] : 2 [de-identified] : lightly active [de-identified] : regular [[id number]] : 0

## 2022-11-25 NOTE — PHYSICAL EXAM
[No Acute Distress] : no acute distress [Well Nourished] : well nourished [Well Developed] : well developed [Well-Appearing] : well-appearing [Normal Sclera/Conjunctiva] : normal sclera/conjunctiva [PERRL] : pupils equal round and reactive to light [EOMI] : extraocular movements intact [Normal Outer Ear/Nose] : the outer ears and nose were normal in appearance [Normal Oropharynx] : the oropharynx was normal [No JVD] : no jugular venous distention [No Lymphadenopathy] : no lymphadenopathy [Supple] : supple [Thyroid Normal, No Nodules] : the thyroid was normal and there were no nodules present [No Respiratory Distress] : no respiratory distress  [No Accessory Muscle Use] : no accessory muscle use [Clear to Auscultation] : lungs were clear to auscultation bilaterally [Normal Rate] : normal rate  [Regular Rhythm] : with a regular rhythm [Normal S1, S2] : normal S1 and S2 [No Murmur] : no murmur heard [No Carotid Bruits] : no carotid bruits [No Abdominal Bruit] : a ~M bruit was not heard ~T in the abdomen [No Varicosities] : no varicosities [Pedal Pulses Present] : the pedal pulses are present [No Edema] : there was no peripheral edema [No Palpable Aorta] : no palpable aorta [No Extremity Clubbing/Cyanosis] : no extremity clubbing/cyanosis [Soft] : abdomen soft [Non Tender] : non-tender [Non-distended] : non-distended [No Masses] : no abdominal mass palpated [No HSM] : no HSM [Normal Bowel Sounds] : normal bowel sounds [Normal Sphincter Tone] : normal sphincter tone [No Mass] : no mass [Normal Posterior Cervical Nodes] : no posterior cervical lymphadenopathy [Normal Anterior Cervical Nodes] : no anterior cervical lymphadenopathy [No CVA Tenderness] : no CVA  tenderness [No Spinal Tenderness] : no spinal tenderness [No Joint Swelling] : no joint swelling [Grossly Normal Strength/Tone] : grossly normal strength/tone [No Rash] : no rash [Coordination Grossly Intact] : coordination grossly intact [No Focal Deficits] : no focal deficits [Normal Gait] : normal gait [Deep Tendon Reflexes (DTR)] : deep tendon reflexes were 2+ and symmetric [Normal Affect] : the affect was normal [Normal Insight/Judgement] : insight and judgment were intact [Stool Occult Blood] : stool negative for occult blood [de-identified] : deferred [FreeTextEntry1] : guaiac negative

## 2022-11-25 NOTE — END OF VISIT
[FreeTextEntry3] : I, Joycelyn Guillen, personally transcribed these services in the presence of Dr. Isabella Sevilla MD.\par I, Dr. Isabella Sevilla MD, personally performed the services described in this documentation as scribed by oJycelyn Guillen in my presence and it is both accurate and complete.

## 2022-11-25 NOTE — HISTORY OF PRESENT ILLNESS
[FreeTextEntry1] : follow-up anemia  [de-identified] : GAMALIEL KRAUSE is a 47 year old female with a PMHx of anemia, HLD, and thalassemia trait who presents today for follow-up mild anemia. She today relates that she keeps dropping objects. She relates Hx of injury to cervical spine as a child for which she had to wear a neck collar for a long time. No work-up was done since.\par \par Pt relates heavy menstruation in the past that is getting better now.

## 2022-11-25 NOTE — PLAN
[FreeTextEntry1] : Blood tests sent to the lab \par \par GI evaluation \par \par  evaluation \par \par Cervical and thoracic x-ray

## 2022-11-27 LAB
BASOPHILS # BLD AUTO: 0.06 K/UL
BASOPHILS NFR BLD AUTO: 1 %
EOSINOPHIL # BLD AUTO: 0.11 K/UL
EOSINOPHIL NFR BLD AUTO: 1.9 %
FERRITIN SERPL-MCNC: 134 NG/ML
FOLATE SERPL-MCNC: >20 NG/ML
HCT VFR BLD CALC: 33.7 %
HGB BLD-MCNC: 10.3 G/DL
IMM GRANULOCYTES NFR BLD AUTO: 0.3 %
IRON SATN MFR SERPL: 44 %
IRON SERPL-MCNC: 127 UG/DL
LYMPHOCYTES # BLD AUTO: 2.05 K/UL
LYMPHOCYTES NFR BLD AUTO: 35.3 %
MAN DIFF?: NORMAL
MCHC RBC-ENTMCNC: 22.5 PG
MCHC RBC-ENTMCNC: 30.6 GM/DL
MCV RBC AUTO: 73.6 FL
MONOCYTES # BLD AUTO: 0.67 K/UL
MONOCYTES NFR BLD AUTO: 11.5 %
NEUTROPHILS # BLD AUTO: 2.9 K/UL
NEUTROPHILS NFR BLD AUTO: 50 %
PLATELET # BLD AUTO: 259 K/UL
RBC # BLD: 4.58 M/UL
RBC # FLD: 15.3 %
TIBC SERPL-MCNC: 290 UG/DL
UIBC SERPL-MCNC: 162 UG/DL
VIT B12 SERPL-MCNC: 430 PG/ML
WBC # FLD AUTO: 5.81 K/UL

## 2022-12-21 ENCOUNTER — APPOINTMENT (OUTPATIENT)
Dept: INTERNAL MEDICINE | Facility: CLINIC | Age: 47
End: 2022-12-21
Payer: MEDICAID

## 2022-12-21 VITALS
SYSTOLIC BLOOD PRESSURE: 114 MMHG | OXYGEN SATURATION: 98 % | BODY MASS INDEX: 21.17 KG/M2 | RESPIRATION RATE: 14 BRPM | HEIGHT: 64 IN | HEART RATE: 72 BPM | TEMPERATURE: 98.4 F | DIASTOLIC BLOOD PRESSURE: 72 MMHG | WEIGHT: 124 LBS

## 2022-12-21 DIAGNOSIS — D64.9 ANEMIA, UNSPECIFIED: ICD-10-CM

## 2022-12-21 PROCEDURE — 99213 OFFICE O/P EST LOW 20 MIN: CPT | Mod: 25

## 2022-12-25 NOTE — HEALTH RISK ASSESSMENT
[Current] : Current [Yes] : Yes [2 - 4 times a month (2 pts)] : 2-4 times a month (2 points) [1 or 2 (0 pts)] : 1 or 2 (0 points) [Never (0 pts)] : Never (0 points) [No] : In the past 12 months have you used drugs other than those required for medical reasons? No [No falls in past year] : Patient reported no falls in the past year [0] : 2) Feeling down, depressed, or hopeless: Not at all (0) [de-identified] : one PPD smoker [de-identified] : Social alcohol use [Audit-CScore] : 2 [de-identified] : lightly active [de-identified] : regular [[id number]] : 0

## 2022-12-25 NOTE — PHYSICAL EXAM
[No Acute Distress] : no acute distress [Well Nourished] : well nourished [Well Developed] : well developed [Well-Appearing] : well-appearing [Normal Sclera/Conjunctiva] : normal sclera/conjunctiva [PERRL] : pupils equal round and reactive to light [EOMI] : extraocular movements intact [Normal Outer Ear/Nose] : the outer ears and nose were normal in appearance [Normal Oropharynx] : the oropharynx was normal [No JVD] : no jugular venous distention [No Lymphadenopathy] : no lymphadenopathy [Supple] : supple [Thyroid Normal, No Nodules] : the thyroid was normal and there were no nodules present [No Respiratory Distress] : no respiratory distress  [No Accessory Muscle Use] : no accessory muscle use [Clear to Auscultation] : lungs were clear to auscultation bilaterally [Normal Rate] : normal rate  [Regular Rhythm] : with a regular rhythm [Normal S1, S2] : normal S1 and S2 [No Murmur] : no murmur heard [No Carotid Bruits] : no carotid bruits [No Abdominal Bruit] : a ~M bruit was not heard ~T in the abdomen [No Varicosities] : no varicosities [Pedal Pulses Present] : the pedal pulses are present [No Edema] : there was no peripheral edema [No Palpable Aorta] : no palpable aorta [No Extremity Clubbing/Cyanosis] : no extremity clubbing/cyanosis [Soft] : abdomen soft [Non Tender] : non-tender [Non-distended] : non-distended [No Masses] : no abdominal mass palpated [No HSM] : no HSM [Normal Bowel Sounds] : normal bowel sounds [Normal Posterior Cervical Nodes] : no posterior cervical lymphadenopathy [Normal Anterior Cervical Nodes] : no anterior cervical lymphadenopathy [No CVA Tenderness] : no CVA  tenderness [No Spinal Tenderness] : no spinal tenderness [No Joint Swelling] : no joint swelling [Grossly Normal Strength/Tone] : grossly normal strength/tone [No Rash] : no rash [Coordination Grossly Intact] : coordination grossly intact [No Focal Deficits] : no focal deficits [Normal Gait] : normal gait [Deep Tendon Reflexes (DTR)] : deep tendon reflexes were 2+ and symmetric [Normal Affect] : the affect was normal [Normal Insight/Judgement] : insight and judgment were intact [de-identified] : deferred [FreeTextEntry1] : N/A

## 2022-12-25 NOTE — HISTORY OF PRESENT ILLNESS
[FreeTextEntry1] : follow-up  [de-identified] : GAMALIEL KRAUSE is a 47 year old female with a PMHx of anemia, emphysema lung, HLD, and thalassemia trait who presents today follow-up. She relates pain upper back, neck, and right shoulder pain  persists. Pt states she started dropping objects.\par \par CT scan of chest was done which showed stable bilateral groundglass pulmonary nodules, the largest of which measures up to 0.6 cm. \par CT scan of abdomen and colonoscopy was done which was WNL as per pt.

## 2022-12-25 NOTE — PLAN
[FreeTextEntry1] : MRI Cervical spine \par \par Pulmonary evaluation \par \par Naproxen 375 mg PO BID prn pain

## 2022-12-25 NOTE — END OF VISIT
[FreeTextEntry3] : I, Joycelyn Guillen, personally transcribed these services in the presence of Dr. Isabella Sevilla MD.\par I, Dr. Isabella Sevilla MD, personally performed the services described in this documentation as scribed by Joycelyn Guillen in my presence and it is both accurate and complete.

## 2023-01-10 ENCOUNTER — APPOINTMENT (OUTPATIENT)
Dept: PULMONOLOGY | Facility: CLINIC | Age: 48
End: 2023-01-10
Payer: MEDICAID

## 2023-01-10 VITALS
OXYGEN SATURATION: 98 % | TEMPERATURE: 99.1 F | SYSTOLIC BLOOD PRESSURE: 98 MMHG | BODY MASS INDEX: 21.34 KG/M2 | WEIGHT: 125 LBS | HEIGHT: 64 IN | HEART RATE: 62 BPM | DIASTOLIC BLOOD PRESSURE: 61 MMHG | RESPIRATION RATE: 14 BRPM

## 2023-01-10 PROCEDURE — 99244 OFF/OP CNSLTJ NEW/EST MOD 40: CPT | Mod: 25

## 2023-01-10 PROCEDURE — 94060 EVALUATION OF WHEEZING: CPT

## 2023-01-10 PROCEDURE — 99204 OFFICE O/P NEW MOD 45 MIN: CPT | Mod: 25

## 2023-01-10 PROCEDURE — 94729 DIFFUSING CAPACITY: CPT

## 2023-01-10 PROCEDURE — 94726 PLETHYSMOGRAPHY LUNG VOLUMES: CPT

## 2023-01-10 NOTE — HISTORY OF PRESENT ILLNESS
[Current] : current [Never] : never [TextBox_4] : Patient is a 47-year-old female past medical history significant for emphysema thalassemia active smoker who presents for pulmonary consult.  The patient had a recent CT chest which revealed stable bilateral groundglass infiltrates which measure approximately 0.6 cm.  Patient denies any recent fevers chills chest pain weight loss or hemoptysis

## 2023-01-10 NOTE — ASSESSMENT
[FreeTextEntry1] : In summary patient is a 47-year-old female with active tobacco abuse and recent groundglass changes seen on CT chest.  Her physical exam is within normal limits.\par \par Pulmonary function test revealed normal lung volumes.  The patient is instructed to continue current medications and follow-up in 6 to 9 months for repeat study

## 2023-01-24 ENCOUNTER — APPOINTMENT (OUTPATIENT)
Dept: INTERNAL MEDICINE | Facility: CLINIC | Age: 48
End: 2023-01-24
Payer: MEDICAID

## 2023-01-24 VITALS
BODY MASS INDEX: 22.02 KG/M2 | SYSTOLIC BLOOD PRESSURE: 95 MMHG | HEIGHT: 64 IN | WEIGHT: 129 LBS | RESPIRATION RATE: 14 BRPM | DIASTOLIC BLOOD PRESSURE: 57 MMHG | TEMPERATURE: 98.4 F | OXYGEN SATURATION: 97 % | HEART RATE: 70 BPM

## 2023-01-24 DIAGNOSIS — D56.3 THALASSEMIA MINOR: ICD-10-CM

## 2023-01-24 PROCEDURE — 99213 OFFICE O/P EST LOW 20 MIN: CPT | Mod: 25

## 2023-01-24 RX ORDER — IBUPROFEN 400 MG/1
400 TABLET, FILM COATED ORAL 3 TIMES DAILY
Qty: 30 | Refills: 1 | Status: ACTIVE | COMMUNITY
Start: 2023-01-24 | End: 1900-01-01

## 2023-01-24 RX ORDER — METAXALONE 800 MG/1
800 TABLET ORAL
Qty: 5 | Refills: 3 | Status: ACTIVE | COMMUNITY
Start: 2023-01-24 | End: 1900-01-01

## 2023-01-29 NOTE — HEALTH RISK ASSESSMENT
[Current] : Current [Yes] : Yes [2 - 4 times a month (2 pts)] : 2-4 times a month (2 points) [1 or 2 (0 pts)] : 1 or 2 (0 points) [Never (0 pts)] : Never (0 points) [No] : In the past 12 months have you used drugs other than those required for medical reasons? No [No falls in past year] : Patient reported no falls in the past year [0] : 2) Feeling down, depressed, or hopeless: Not at all (0) [de-identified] : one PPD smoker [de-identified] : Social alcohol use [Audit-CScore] : 2 [de-identified] : lightly active [de-identified] : regular [[id number]] : 0

## 2023-01-29 NOTE — PHYSICAL EXAM
[No Acute Distress] : no acute distress [Well Nourished] : well nourished [Well Developed] : well developed [Well-Appearing] : well-appearing [Normal Sclera/Conjunctiva] : normal sclera/conjunctiva [PERRL] : pupils equal round and reactive to light [EOMI] : extraocular movements intact [Normal Outer Ear/Nose] : the outer ears and nose were normal in appearance [Normal Oropharynx] : the oropharynx was normal [No JVD] : no jugular venous distention [No Lymphadenopathy] : no lymphadenopathy [Supple] : supple [Thyroid Normal, No Nodules] : the thyroid was normal and there were no nodules present [No Respiratory Distress] : no respiratory distress  [No Accessory Muscle Use] : no accessory muscle use [Clear to Auscultation] : lungs were clear to auscultation bilaterally [Normal Rate] : normal rate  [Regular Rhythm] : with a regular rhythm [Normal S1, S2] : normal S1 and S2 [No Murmur] : no murmur heard [No Carotid Bruits] : no carotid bruits [No Abdominal Bruit] : a ~M bruit was not heard ~T in the abdomen [No Varicosities] : no varicosities [Pedal Pulses Present] : the pedal pulses are present [No Edema] : there was no peripheral edema [No Palpable Aorta] : no palpable aorta [No Extremity Clubbing/Cyanosis] : no extremity clubbing/cyanosis [Normal Appearance] : normal in appearance [No Nipple Discharge] : no nipple discharge [No Axillary Lymphadenopathy] : no axillary lymphadenopathy [Soft] : abdomen soft [Non Tender] : non-tender [Non-distended] : non-distended [No Masses] : no abdominal mass palpated [No HSM] : no HSM [Normal Bowel Sounds] : normal bowel sounds [Normal Posterior Cervical Nodes] : no posterior cervical lymphadenopathy [Normal Anterior Cervical Nodes] : no anterior cervical lymphadenopathy [No Spinal Tenderness] : no spinal tenderness [No CVA Tenderness] : no CVA  tenderness [No Joint Swelling] : no joint swelling [Grossly Normal Strength/Tone] : grossly normal strength/tone [No Rash] : no rash [Coordination Grossly Intact] : coordination grossly intact [No Focal Deficits] : no focal deficits [Normal Gait] : normal gait [Deep Tendon Reflexes (DTR)] : deep tendon reflexes were 2+ and symmetric [Normal Affect] : the affect was normal [Normal Insight/Judgement] : insight and judgment were intact [FreeTextEntry1] : deferred

## 2023-01-29 NOTE — PLAN
[FreeTextEntry1] : ibuprofen 400 mg  [id number] prn pain\par \par skelaxin 800 mg take half tablet po qhs prn pain\par \par spine surgery evaluation

## 2023-01-29 NOTE — END OF VISIT
[FreeTextEntry3] :  I, Corina Avilez, personally transcribed these services in the presence of Dr. Isabella Sevilla MD.\par I, Dr. Isabella Sevilla MD, personally performed the services described in this documentation as scribed by Corina Avilez in my presence and it is both accurate and complete.

## 2023-01-29 NOTE — HISTORY OF PRESENT ILLNESS
[FreeTextEntry1] : follow-up  [de-identified] : GAMALIEL KRAUSE is a 47 year old female with a PMHx of anemia, emphysema lung, HLD, and thalassemia trait who presents today for follow-up. Pt was seen by pulmonary and advised to have CT chest in one year. Pt had an MRI OF the cervical spine. MRI of cervial spine demonstrates: 1)C6-7 grade 1 retrolisthesis with broad-based disc herniation uncinate spurring causing moderate foraminal narrowing. 2) C5-6 broad-based disc herniation with uncinate spurring and mild to moderate foraminal narrowing.\par \par  Pt state that the neck pain persists

## 2023-02-01 ENCOUNTER — APPOINTMENT (OUTPATIENT)
Dept: ORTHOPEDIC SURGERY | Facility: CLINIC | Age: 48
End: 2023-02-01
Payer: MEDICAID

## 2023-02-01 VITALS
BODY MASS INDEX: 22.02 KG/M2 | SYSTOLIC BLOOD PRESSURE: 103 MMHG | DIASTOLIC BLOOD PRESSURE: 58 MMHG | WEIGHT: 129 LBS | OXYGEN SATURATION: 97 % | HEIGHT: 64 IN | HEART RATE: 63 BPM

## 2023-02-01 PROCEDURE — 72020 X-RAY EXAM OF SPINE 1 VIEW: CPT

## 2023-02-01 PROCEDURE — 99205 OFFICE O/P NEW HI 60 MIN: CPT

## 2023-02-01 NOTE — HISTORY OF PRESENT ILLNESS
[de-identified] : 47-year-old female originally from [facility] presents for evaluation of cervical spine.  Scribes a traumatic incident when she was approximately 5 years old which resulted in significant neck pain, was informed that this is not uncommon episode for pediatric patients with a collar and bedrest for several months.  She describes chronic right-sided neck pain since that time which has been manageable.  She she was growing up able to participate in competitive swimming and still enjoys this for exercise.  Over the past 1 year she describes atraumatic onset of worsening radiation of right-sided neck pain to the interscapular region, anterior lateral intercostal region as well as down the posterior lateral aspect of the arm and forearm into the middle digits.  This is described as a discomfort and burning pain without any associated numbness or tingling or focal weakness.  She also describes for approximately 1 year noticing that she occasionally feels off balance when walking.  She also describes several episodes of dropping objects particularly demanding something small to someone else.  This is notable on both sides though perhaps more on the right if she is right-hand dominant.

## 2023-02-01 NOTE — DISCUSSION/SUMMARY
[de-identified] : A thorough conversation was held the patient regarding her condition the natural history all treatment options risks benefits and alternatives.  I do feel that while I am unable to elicit any abnormal neurologic physical exam findings today, her description of changes in finger dexterity and balance on an intermittent basis in addition to the MRI findings at the C4-5 level are indicative of symptomatic cord compression.  I have also explained that a slightly different process is likely ongoing at the C5-6 level resulting in compression of the C6 nerve root via foraminal stenosis.  Management of cervical radiculopathy is largely symptomatic with many nonoperative options, however I have explained that treatment of myelopathy is often recommended surgically due to the known progressive nature of degenerative cord compression as well as a less predictable course of recovery should symptoms progress considerably prior to addressing pathology.  With regards to her suspected cervical radiculopathy I recommend an EMG of the bilateral upper extremities.  I also recommend a cervical spine CT scan to further evaluate what appears to be chronic disc herniations for possible calcification also to examine the facet joints at these levels.  If surgery is pursued I recommend an anterior approach for C4-6 ACDF versus cervical disc arthroplasty, the results of the CT scan will help inform the relative merits of downsides of each.  We will follow-up upon completion of this diagnostic work-up to discuss appropriate next steps.\par \par I have spent greater than 60 minutes preparing to see the patient, collecting relevant history, performing a thorough history and physical examination, counseling the patient regarding my findings ordering the appropriate therapies and tests, communicating with other relevant healthcare professionals, documenting my encounter and coordinating care.\par

## 2023-02-01 NOTE — ASSESSMENT
[FreeTextEntry1] : 47-year-old female with likely early stage symptoms of cervical myelopathy resulting from focal HNP at C4-5 as well as suspected right-sided C6 radiculopathy secondary to C5-6 foraminal stenosis

## 2023-02-01 NOTE — PHYSICAL EXAM
[UE/LE] : Sensory: Intact in bilateral upper & lower extremities [Knee] : patellar 2+ and symmetric bilaterally [Ankle] : ankle 2+ and symmetric bilaterally [Normal Touch] : sensation intact for touch [Normal Proprioception] : sensation intact for proprioception [Normal] : No costovertebral angle tenderness and no spinal tenderness [de-identified] : Jane's negative, inverted BR negative\par Significant tightness of the trapezius musculature bilaterally, with some minor bilateral scapular dyskinesia [de-identified] : MRI cervical spine 1/13/2023 [facility]:\par At C4-5 there is a focal posterior disc bulge in the setting of degenerative disease resulting in ventral abutment of the cord with a small amount of intraparenchymal signal which may represent early myelomalacia.  At C5-6 there is a more broad-based posterior bulge with more advanced disc space disease resulting in moderate to severe bilateral foraminal stenosis without clear abutment of the cord at this level.\par \par X-ray cervical spine 2/1/2023 [facility] P[facility]:\par Flattening of lumbar lordosis with kyphosis between C4-C6.  Significant degenerative disc disease with loss of height and endplate sclerosis notable at C5-6 and to lesser extent at C4-5.  There is an absence of significant facet arthrosis.  No significant coronal abnormality.  Active flexion extension is maintained at all levels, decreased extension C5-6.

## 2024-02-06 ENCOUNTER — APPOINTMENT (OUTPATIENT)
Dept: PULMONOLOGY | Facility: CLINIC | Age: 49
End: 2024-02-06
Payer: MEDICAID

## 2024-02-06 VITALS
DIASTOLIC BLOOD PRESSURE: 73 MMHG | RESPIRATION RATE: 14 BRPM | WEIGHT: 121 LBS | BODY MASS INDEX: 20.66 KG/M2 | HEIGHT: 64 IN | HEART RATE: 70 BPM | OXYGEN SATURATION: 98 % | SYSTOLIC BLOOD PRESSURE: 116 MMHG

## 2024-02-06 DIAGNOSIS — Z86.39 PERSONAL HISTORY OF OTHER ENDOCRINE, NUTRITIONAL AND METABOLIC DISEASE: ICD-10-CM

## 2024-02-06 DIAGNOSIS — M54.12 RADICULOPATHY, CERVICAL REGION: ICD-10-CM

## 2024-02-06 DIAGNOSIS — Z78.9 OTHER SPECIFIED HEALTH STATUS: ICD-10-CM

## 2024-02-06 DIAGNOSIS — R93.89 ABNORMAL FINDINGS ON DIAGNOSTIC IMAGING OF OTHER SPECIFIED BODY STRUCTURES: ICD-10-CM

## 2024-02-06 DIAGNOSIS — R91.8 OTHER NONSPECIFIC ABNORMAL FINDING OF LUNG FIELD: ICD-10-CM

## 2024-02-06 DIAGNOSIS — Z80.8 FAMILY HISTORY OF MALIGNANT NEOPLASM OF OTHER ORGANS OR SYSTEMS: ICD-10-CM

## 2024-02-06 DIAGNOSIS — J43.9 EMPHYSEMA, UNSPECIFIED: ICD-10-CM

## 2024-02-06 DIAGNOSIS — Z83.49 FAMILY HISTORY OF OTHER ENDOCRINE, NUTRITIONAL AND METABOLIC DISEASES: ICD-10-CM

## 2024-02-06 PROCEDURE — 94729 DIFFUSING CAPACITY: CPT

## 2024-02-06 PROCEDURE — 94726 PLETHYSMOGRAPHY LUNG VOLUMES: CPT

## 2024-02-06 PROCEDURE — 94060 EVALUATION OF WHEEZING: CPT

## 2024-02-06 PROCEDURE — 99212 OFFICE O/P EST SF 10 MIN: CPT

## 2024-02-06 NOTE — REASON FOR VISIT
[Follow-Up] : a follow-up visit [Abnormal CXR/ Chest CT] : an abnormal CXR/ chest CT [Emphysema] : emphysema [Shortness of Breath] : shortness of breath

## 2024-02-06 NOTE — ASSESSMENT
[FreeTextEntry1] : In summary patient is a 48-year-old female with active tobacco abuse and recent groundglass changes seen on CT chest presents for follow up.  Her physical exam is within normal limits.  Pulmonary function test revealed normal lung volumes.  The patient is instructed to continue current medications and follow-up in 6 months Repeat CT chest ordered.  Patient is now referred to neurosurgery.

## 2024-02-06 NOTE — HISTORY OF PRESENT ILLNESS
[Current] : current [Never] : never [TextBox_4] : Patient is a 48-year-old female past medical history significant for emphysema thalassemia active smoker who presents presents today for follow up. Patient complaining of neck pain and shortness of breath. Patient also complains of SANCHEZ at times

## 2024-02-20 ENCOUNTER — APPOINTMENT (OUTPATIENT)
Dept: FAMILY MEDICINE | Facility: CLINIC | Age: 49
End: 2024-02-20
Payer: MEDICAID

## 2024-02-20 ENCOUNTER — LABORATORY RESULT (OUTPATIENT)
Age: 49
End: 2024-02-20

## 2024-02-20 VITALS
OXYGEN SATURATION: 99 % | SYSTOLIC BLOOD PRESSURE: 118 MMHG | WEIGHT: 124 LBS | TEMPERATURE: 97.8 F | HEART RATE: 71 BPM | BODY MASS INDEX: 21.17 KG/M2 | HEIGHT: 64 IN | DIASTOLIC BLOOD PRESSURE: 82 MMHG | RESPIRATION RATE: 14 BRPM

## 2024-02-20 DIAGNOSIS — F17.200 NICOTINE DEPENDENCE, UNSPECIFIED, UNCOMPLICATED: ICD-10-CM

## 2024-02-20 DIAGNOSIS — Z00.00 ENCOUNTER FOR GENERAL ADULT MEDICAL EXAMINATION W/OUT ABNORMAL FINDINGS: ICD-10-CM

## 2024-02-20 DIAGNOSIS — M50.20 OTHER CERVICAL DISC DISPLACEMENT, UNSPECIFIED CERVICAL REGION: ICD-10-CM

## 2024-02-20 DIAGNOSIS — R31.9 HEMATURIA, UNSPECIFIED: ICD-10-CM

## 2024-02-20 DIAGNOSIS — E78.5 HYPERLIPIDEMIA, UNSPECIFIED: ICD-10-CM

## 2024-02-20 PROCEDURE — 99386 PREV VISIT NEW AGE 40-64: CPT | Mod: 25

## 2024-02-20 RX ORDER — NICOTINE 21-14-7MG
21-14-7 KIT TRANSDERMAL
Qty: 1 | Refills: 0 | Status: ACTIVE | COMMUNITY
Start: 2024-02-20 | End: 1900-01-01

## 2024-02-20 NOTE — HEALTH RISK ASSESSMENT
[Good] : ~his/her~  mood as  good [Yes] : Yes [2 - 4 times a month (2 pts)] : 2-4 times a month (2 points) [1 or 2 (0 pts)] : 1 or 2 (0 points) [Never (0 pts)] : Never (0 points) [0] : 2) Feeling down, depressed, or hopeless: Not at all (0) [PHQ-2 Negative - No further assessment needed] : PHQ-2 Negative - No further assessment needed [Patient reported mammogram was normal] : Patient reported mammogram was normal [Patient reported PAP Smear was normal] : Patient reported PAP Smear was normal [HIV test declined] : HIV test declined [Hepatitis C test declined] : Hepatitis C test declined [With Significant Other] : lives with significant other [Employed] : employed [] :  [Sexually Active] : sexually active [Current] : Current [10-14] : 10-14 [< 15 Years] : < 15 Years [[id number]] : 0 [High Risk Behavior] : no high risk behavior [Reports changes in hearing] : Reports no changes in hearing [Reports changes in vision] : Reports no changes in vision [Reports changes in dental health] : Reports no changes in dental health [Travel to Developing Areas] : does not  travel to developing areas [TB Exposure] : is not being exposed to tuberculosis [MammogramComments] : 1 month [PapSmearComments] : 1 yr [FreeTextEntry2] : [employer]

## 2024-02-20 NOTE — PLAN
[FreeTextEntry1] : Herniated cervical disc- fu with pain management Smoker- nicotine patch Chronic hematuria- urology referral  HLD- diet discussed.

## 2024-02-20 NOTE — HISTORY OF PRESENT ILLNESS
[de-identified] : 48 year old female with history of HLD, Chronic hematuria, Emphysema, Cervical herniated discs presents for annual exam.  She will be going for epidural and consider surgery for her neck and back pain at [facility].

## 2024-02-22 LAB
ALBUMIN SERPL ELPH-MCNC: 4.8 G/DL
ALP BLD-CCNC: 81 U/L
ALT SERPL-CCNC: 9 U/L
ANION GAP SERPL CALC-SCNC: 12 MMOL/L
AST SERPL-CCNC: 17 U/L
BASOPHILS # BLD AUTO: 0.07 K/UL
BASOPHILS NFR BLD AUTO: 1 %
BILIRUB SERPL-MCNC: 0.7 MG/DL
BUN SERPL-MCNC: 14 MG/DL
CALCIUM SERPL-MCNC: 10 MG/DL
CHLORIDE SERPL-SCNC: 103 MMOL/L
CHOLEST SERPL-MCNC: 225 MG/DL
CO2 SERPL-SCNC: 24 MMOL/L
CREAT SERPL-MCNC: 0.54 MG/DL
EGFR: 114 ML/MIN/1.73M2
EOSINOPHIL # BLD AUTO: 0.15 K/UL
EOSINOPHIL NFR BLD AUTO: 2.1 %
ESTIMATED AVERAGE GLUCOSE: 97 MG/DL
GLUCOSE SERPL-MCNC: 109 MG/DL
HBA1C MFR BLD HPLC: 5 %
HCT VFR BLD CALC: 35.2 %
HDLC SERPL-MCNC: 72 MG/DL
HGB BLD-MCNC: 11.1 G/DL
IMM GRANULOCYTES NFR BLD AUTO: 0.3 %
LDLC SERPL CALC-MCNC: 132 MG/DL
LYMPHOCYTES # BLD AUTO: 2.12 K/UL
LYMPHOCYTES NFR BLD AUTO: 30.2 %
MAN DIFF?: NORMAL
MCHC RBC-ENTMCNC: 22 PG
MCHC RBC-ENTMCNC: 31.5 GM/DL
MCV RBC AUTO: 69.7 FL
MONOCYTES # BLD AUTO: 0.61 K/UL
MONOCYTES NFR BLD AUTO: 8.7 %
NEUTROPHILS # BLD AUTO: 4.06 K/UL
NEUTROPHILS NFR BLD AUTO: 57.7 %
NONHDLC SERPL-MCNC: 153 MG/DL
PLATELET # BLD AUTO: 291 K/UL
POTASSIUM SERPL-SCNC: 4.6 MMOL/L
PROT SERPL-MCNC: 7.8 G/DL
RBC # BLD: 5.05 M/UL
RBC # FLD: 16.4 %
SODIUM SERPL-SCNC: 139 MMOL/L
T3 SERPL-MCNC: 98 NG/DL
T4 FREE SERPL-MCNC: 1.2 NG/DL
TRIGL SERPL-MCNC: 122 MG/DL
TSH SERPL-ACNC: 1.37 UIU/ML
WBC # FLD AUTO: 7.03 K/UL

## 2024-03-05 ENCOUNTER — APPOINTMENT (OUTPATIENT)
Dept: UROLOGY | Facility: CLINIC | Age: 49
End: 2024-03-05
Payer: MEDICAID

## 2024-03-05 VITALS
SYSTOLIC BLOOD PRESSURE: 102 MMHG | HEART RATE: 78 BPM | OXYGEN SATURATION: 98 % | WEIGHT: 124 LBS | BODY MASS INDEX: 21.17 KG/M2 | HEIGHT: 64 IN | RESPIRATION RATE: 14 BRPM | DIASTOLIC BLOOD PRESSURE: 63 MMHG | TEMPERATURE: 99.2 F

## 2024-03-05 DIAGNOSIS — R31.29 OTHER MICROSCOPIC HEMATURIA: ICD-10-CM

## 2024-03-05 PROCEDURE — 99204 OFFICE O/P NEW MOD 45 MIN: CPT

## 2024-03-05 NOTE — HISTORY OF PRESENT ILLNESS
[FreeTextEntry1] : GAMALIEL KRAUSE Mar  9 1975   Language: English Date of First visit: 03/05/2024 Accompanied by: self Contact info: Referring Provider/PCP: Dr. Jordan Fax:   CC/ Problem List:  microhematuria. =============================================================================== FIRST VISIT / Summary: Very pleasant 48 year old F here for microhematuria  They denied risk factors except as noted above including but not limited to: chemicals including dye, petroleum derivatives, chemotherapy, radiation, foreign objects (stents, catheters, stones, etc), or infections (TB, schistosomiasis, etc).    ------------------------------------------------------------------------------------------- INTERVAL VISITS:   ===============================================================================   PMH: none Meds: none All: nkda FHx: No  malignancies  SocHx: smokes 1/2 pack / day x 20 years, social etoh,    PSH: none   ROS: Review of Systems is as per HPI unless otherwise denoted below   =============================================================================== DATA: LABS (SELECTED):---------------------------------------------------------------------------------------------------     RADS:-------------------------------------------------------------------------------------------------------------------  11/22/2023: CT urogram normal kidneys and bladder.    PATHOLOGY/CYTOLOGY:-------------------------------------------------------------------------------------------     VOIDING STUDIES: ----------------------------------------------------------------------------------------------------     STONE STUDIES: (Analysis/LLSA)----------------------------------------------------------------------------------     PROCEDURES: -----------------------------------------------------------------------------------------------       =============================================================================== PHYSICAL EXAM:    FOCUSED: ----------------------------------------------------------------------------------------------------------------     ======================================================================================= DISCUSSION:    ======================================================================================= ASSESSMENT and PLAN   1. microhematuria - mod risk - had prior CT urogram 2022 negative - renal bladder US - needs cysto - schedule after US - discussed smoking cessation - has patches from Dr. Jordan   =======================================================================================  The total time personally spent preparing for this visit (reviewing test results, obtaining external history) and during the visit (ordering tests/medications, spent face to face with the patient / family and counseling them on the above), as well as after the visit (on clinical documentation and coordination with other care providers) was approximately 45 minutes.  Thank you for allowing me to assist in the care of your patient. Should you have any questions please do not hesitate to reach out to me.     Tee Quintero MD                                                         [facility] Physician [facility]   [place] Office: [address], [address] [place] [address] T: [phone] F: [phone]   [place] Office: [address], [address] [place] [address] T: [phone] F: [phone]

## 2024-04-01 ENCOUNTER — APPOINTMENT (OUTPATIENT)
Dept: ORTHOPEDIC SURGERY | Facility: CLINIC | Age: 49
End: 2024-04-01
Payer: MEDICAID

## 2024-04-01 VITALS
WEIGHT: 124 LBS | HEIGHT: 64 IN | BODY MASS INDEX: 21.17 KG/M2 | TEMPERATURE: 97.2 F | HEART RATE: 75 BPM | OXYGEN SATURATION: 89 % | SYSTOLIC BLOOD PRESSURE: 121 MMHG | DIASTOLIC BLOOD PRESSURE: 79 MMHG

## 2024-04-01 DIAGNOSIS — M54.2 CERVICALGIA: ICD-10-CM

## 2024-04-01 PROCEDURE — 99215 OFFICE O/P EST HI 40 MIN: CPT

## 2024-04-01 PROCEDURE — 72050 X-RAY EXAM NECK SPINE 4/5VWS: CPT

## 2024-04-01 NOTE — HISTORY OF PRESENT ILLNESS
[de-identified] : Returns today regarding right-sided scapular chest wall upper extremity pain.  This is in a similar distribution to previously it extends from the right side of the neck into the suprascapular region and occasionally to the chest wall posterior lateral arm.  Forearm with some pain that ends in the forearm the numbness that extends to the thumb and index finger.  This has been intermittent over the past year however has worsened over the last 1 to 2 months.  She has initiated some physical therapy recently mainly consisting of massage which has helped

## 2024-04-01 NOTE — ASSESSMENT
[FreeTextEntry1] : 49 old female with chronic right-sided C6 radiculopathy in the setting of C5-6 degenerative disc disease

## 2024-04-01 NOTE — PHYSICAL EXAM
[UE/LE] : Sensory: Intact in bilateral upper & lower extremities [Knee] : patellar 2+ and symmetric bilaterally [Ankle] : ankle 2+ and symmetric bilaterally [Normal Touch] : sensation intact for touch [Normal Proprioception] : sensation intact for proprioception [Normal] : No costovertebral angle tenderness and no spinal tenderness [de-identified] : 4 view cervical spine x-ray 4/1/2024 Ortho PACS: Disc base degeneration at C5-6 without evidence of instability.  Overall coronal and sagittal lines maintained with minimal loss of cervical lordosis  Prior MRI [facility] January 2023 with bilateral foraminal stenosis at C5-6 in the setting of degenerative disc disease

## 2024-04-01 NOTE — DISCUSSION/SUMMARY
[de-identified] : We discussed all available treatment options at this point.  She does not feel that she wishes to undergo any surgical intervention.  I recommend that she continue physical therapy as well as consider the utility of an epidural steroid injection.  I made a referral for this today I would like to know how she is feeling afterwards.  All questions answered and she will keep us informed of her progress  I have spent greater than 45 minutes preparing to see the patient, collecting relevant history, performing a thorough history and physical examination, counseling the patient regarding my findings ordering the appropriate therapies and tests, communicating with other relevant healthcare professionals, documenting my encounter and coordinating care.

## 2024-04-02 ENCOUNTER — APPOINTMENT (OUTPATIENT)
Dept: UROLOGY | Facility: CLINIC | Age: 49
End: 2024-04-02
Payer: MEDICAID

## 2024-04-02 VITALS
OXYGEN SATURATION: 98 % | HEIGHT: 64 IN | TEMPERATURE: 97.8 F | RESPIRATION RATE: 15 BRPM | BODY MASS INDEX: 21.17 KG/M2 | SYSTOLIC BLOOD PRESSURE: 106 MMHG | HEART RATE: 68 BPM | DIASTOLIC BLOOD PRESSURE: 73 MMHG | WEIGHT: 124 LBS

## 2024-04-02 PROCEDURE — 99214 OFFICE O/P EST MOD 30 MIN: CPT

## 2024-04-02 NOTE — HISTORY OF PRESENT ILLNESS
[FreeTextEntry1] : GAMALIEL KRAUSE Mar 9 1975  Language: English Date of First visit: 03/05/2024 Accompanied by: self Contact info: Referring Provider/PCP: Dr. Jordan Fax: tw  CC/ Problem List: microhematuria. =============================================================================== FIRST VISIT / Summary: Very pleasant 48 year old F here for microhematuria  They denied risk factors except as noted above including but not limited to: chemicals including dye, petroleum derivatives, chemotherapy, radiation, foreign objects (stents, catheters, stones, etc), or infections (TB, schistosomiasis, etc).  ------------------------------------------------------------------------------------------- INTERVAL VISITS: The patient's medications and allergies were reviewed and edited below. Dated 04/02/2024   She has been worried about cystoscopy and declines today despite long discussion regarding risks. She has family history of Alport syndrome and  ===============================================================================  PMH: none Meds: none All: nkda FHx: No  malignancies. Al[place] syndrome. SocHx: smokes 1/2 pack / day x 20 years, social etoh,  PSH: none  ROS: Review of Systems is as per HPI unless otherwise denoted below  =============================================================================== DATA: LABS (SELECTED):---------------------------------------------------------------------------------------------------   RADS:------------------------------------------------------------------------------------------------------------------- 11/21/2022: CT urogram [facility]: normal kidneys and bladder. 3/27/24: RBUS [facility]: normal kidneys and bladder. Prevoid 518, PVR 0.  PATHOLOGY/CYTOLOGY:-------------------------------------------------------------------------------------------   VOIDING STUDIES: ----------------------------------------------------------------------------------------------------   STONE STUDIES: (Analysis/LLSA)----------------------------------------------------------------------------------   PROCEDURES: -----------------------------------------------------------------------------------------------    =============================================================================== PHYSICAL EXAM:   FOCUSED: ----------------------------------------------------------------------------------------------------------------   ======================================================================================= DISCUSSION: Long discussion re possible missed diagnosis. She has smoking risk factor and discussed at length quitting (for good - she has prior stopped for several years at a time).   ======================================================================================= ASSESSMENT and PLAN  1. microhematuria - mod risk - had prior CT urogram 2022 negative - renal bladder US normal - cysto - declined wishes to have in 1 year - discussed smoking cessation - has not yet started patches from Dr. Jordan  ======================================================================================= The total time personally spent preparing for this visit (reviewing test results, obtaining external history) and during the visit (ordering tests/medications, spent face to face with the patient / family and counseling them on the above), as well as after the visit (on clinical documentation and coordination with other care providers) was approximately 35 minutes.  Thank you for allowing me to assist in the care of your patient. Should you have any questions please do not hesitate to reach out to me.   Tee Quintero MD       [facility] Physician [facility]  [place] Office: [address], [address] [place] [address] T: [phone] F: [phone]  [place] Office: [address], [address] [place] [address] T: [phone] F: [phone]

## 2024-05-21 ENCOUNTER — APPOINTMENT (OUTPATIENT)
Dept: FAMILY MEDICINE | Facility: CLINIC | Age: 49
End: 2024-05-21

## 2024-09-30 RX ORDER — DICLOFENAC SODIUM 10 MG/G
1 GEL TOPICAL DAILY
Qty: 1 | Refills: 3 | Status: ACTIVE | COMMUNITY
Start: 2024-09-30 | End: 1900-01-01

## 2025-01-07 ENCOUNTER — APPOINTMENT (OUTPATIENT)
Age: 50
End: 2025-01-07

## 2025-01-07 VITALS
WEIGHT: 125.2 LBS | DIASTOLIC BLOOD PRESSURE: 77 MMHG | OXYGEN SATURATION: 98 % | SYSTOLIC BLOOD PRESSURE: 120 MMHG | RESPIRATION RATE: 15 BRPM | HEART RATE: 73 BPM | BODY MASS INDEX: 21.37 KG/M2 | TEMPERATURE: 98.4 F | HEIGHT: 64 IN

## 2025-01-07 DIAGNOSIS — E78.5 HYPERLIPIDEMIA, UNSPECIFIED: ICD-10-CM

## 2025-01-07 DIAGNOSIS — F17.200 NICOTINE DEPENDENCE, UNSPECIFIED, UNCOMPLICATED: ICD-10-CM

## 2025-01-07 DIAGNOSIS — R31.9 HEMATURIA, UNSPECIFIED: ICD-10-CM

## 2025-01-07 DIAGNOSIS — J43.9 EMPHYSEMA, UNSPECIFIED: ICD-10-CM

## 2025-01-07 DIAGNOSIS — J11.1 INFLUENZA DUE TO UNIDENTIFIED INFLUENZA VIRUS WITH OTHER RESPIRATORY MANIFESTATIONS: ICD-10-CM

## 2025-01-07 PROCEDURE — 99214 OFFICE O/P EST MOD 30 MIN: CPT | Mod: 25

## 2025-01-07 PROCEDURE — 36415 COLL VENOUS BLD VENIPUNCTURE: CPT

## 2025-01-07 PROCEDURE — 87804 INFLUENZA ASSAY W/OPTIC: CPT | Mod: QW

## 2025-01-13 LAB
FLUAV SPEC QL CULT: NEGATIVE
FLUBV AG SPEC QL IA: NEGATIVE

## 2025-01-23 ENCOUNTER — APPOINTMENT (OUTPATIENT)
Age: 50
End: 2025-01-23

## 2025-01-23 VITALS
HEART RATE: 78 BPM | HEIGHT: 64 IN | TEMPERATURE: 98 F | RESPIRATION RATE: 14 BRPM | WEIGHT: 122 LBS | BODY MASS INDEX: 20.83 KG/M2 | DIASTOLIC BLOOD PRESSURE: 73 MMHG | SYSTOLIC BLOOD PRESSURE: 112 MMHG | OXYGEN SATURATION: 98 %

## 2025-01-23 DIAGNOSIS — J43.9 EMPHYSEMA, UNSPECIFIED: ICD-10-CM

## 2025-01-23 PROCEDURE — 94729 DIFFUSING CAPACITY: CPT

## 2025-01-23 PROCEDURE — 94060 EVALUATION OF WHEEZING: CPT

## 2025-01-23 PROCEDURE — ZZZZZ: CPT

## 2025-01-23 PROCEDURE — 94726 PLETHYSMOGRAPHY LUNG VOLUMES: CPT

## 2025-01-23 PROCEDURE — 99213 OFFICE O/P EST LOW 20 MIN: CPT

## 2025-03-03 ENCOUNTER — APPOINTMENT (OUTPATIENT)
Age: 50
End: 2025-03-03

## 2025-03-27 PROBLEM — J11.1 INFLUENZA: Status: RESOLVED | Noted: 2025-01-07 | Resolved: 2025-02-06

## 2025-04-09 ENCOUNTER — APPOINTMENT (OUTPATIENT)
Age: 50
End: 2025-04-09
Payer: MEDICAID

## 2025-04-09 VITALS
WEIGHT: 122.13 LBS | HEART RATE: 77 BPM | OXYGEN SATURATION: 96 % | DIASTOLIC BLOOD PRESSURE: 67 MMHG | SYSTOLIC BLOOD PRESSURE: 99 MMHG | HEIGHT: 62 IN | BODY MASS INDEX: 22.48 KG/M2 | RESPIRATION RATE: 14 BRPM

## 2025-04-09 DIAGNOSIS — R31.29 OTHER MICROSCOPIC HEMATURIA: ICD-10-CM

## 2025-04-09 DIAGNOSIS — R31.9 HEMATURIA, UNSPECIFIED: ICD-10-CM

## 2025-04-09 PROCEDURE — A4649 SURGICAL SUPPLIES: CPT

## 2025-04-09 PROCEDURE — 52000 CYSTOURETHROSCOPY: CPT

## 2025-04-09 PROCEDURE — 99213 OFFICE O/P EST LOW 20 MIN: CPT | Mod: 25

## 2025-04-10 LAB
APPEARANCE: ABNORMAL
BACTERIA: ABNORMAL /HPF
BILIRUBIN URINE: NEGATIVE
BLOOD URINE: ABNORMAL
CAST: 0 /LPF
COLOR: NORMAL
EPITHELIAL CELLS: 7 /HPF
GLUCOSE QUALITATIVE U: NEGATIVE MG/DL
KETONES URINE: ABNORMAL MG/DL
LEUKOCYTE ESTERASE URINE: ABNORMAL
MICROSCOPIC-UA: NORMAL
NITRITE URINE: NEGATIVE
PH URINE: 6
PROTEIN URINE: 30 MG/DL
RED BLOOD CELLS URINE: 93 /HPF
SPECIFIC GRAVITY URINE: >1.03
URINE CYTOLOGY: NORMAL
UROBILINOGEN URINE: 1 MG/DL
WHITE BLOOD CELLS URINE: 2 /HPF

## 2025-04-11 LAB — BACTERIA UR CULT: NORMAL

## 2025-04-24 ENCOUNTER — LABORATORY RESULT (OUTPATIENT)
Age: 50
End: 2025-04-24

## 2025-04-24 ENCOUNTER — APPOINTMENT (OUTPATIENT)
Age: 50
End: 2025-04-24

## 2025-04-24 PROCEDURE — 36415 COLL VENOUS BLD VENIPUNCTURE: CPT

## 2025-04-25 ENCOUNTER — NON-APPOINTMENT (OUTPATIENT)
Age: 50
End: 2025-04-25

## 2025-04-28 ENCOUNTER — APPOINTMENT (OUTPATIENT)
Age: 50
End: 2025-04-28
Payer: MEDICAID

## 2025-04-28 VITALS
DIASTOLIC BLOOD PRESSURE: 70 MMHG | HEIGHT: 62 IN | BODY MASS INDEX: 22.08 KG/M2 | RESPIRATION RATE: 14 BRPM | HEART RATE: 74 BPM | OXYGEN SATURATION: 97 % | SYSTOLIC BLOOD PRESSURE: 132 MMHG | TEMPERATURE: 99.5 F | WEIGHT: 120 LBS

## 2025-04-28 DIAGNOSIS — M72.2 PLANTAR FASCIAL FIBROMATOSIS: ICD-10-CM

## 2025-04-28 DIAGNOSIS — D64.9 ANEMIA, UNSPECIFIED: ICD-10-CM

## 2025-04-28 LAB
ALBUMIN SERPL ELPH-MCNC: 4.4 G/DL
ALP BLD-CCNC: 80 U/L
ALT SERPL-CCNC: 8 U/L
ANION GAP SERPL CALC-SCNC: 15 MMOL/L
APPEARANCE: CLEAR
AST SERPL-CCNC: 20 U/L
BACTERIA: ABNORMAL /HPF
BASOPHILS # BLD AUTO: 0.05 K/UL
BASOPHILS NFR BLD AUTO: 0.9 %
BILIRUB SERPL-MCNC: 0.9 MG/DL
BILIRUBIN URINE: NEGATIVE
BLOOD URINE: ABNORMAL
BUN SERPL-MCNC: 9 MG/DL
CALCIUM SERPL-MCNC: 9.2 MG/DL
CAST: 0 /LPF
CHLORIDE SERPL-SCNC: 101 MMOL/L
CHOLEST SERPL-MCNC: 191 MG/DL
CO2 SERPL-SCNC: 23 MMOL/L
COLOR: YELLOW
CREAT SERPL-MCNC: 0.53 MG/DL
EGFRCR SERPLBLD CKD-EPI 2021: 113 ML/MIN/1.73M2
EOSINOPHIL # BLD AUTO: 0.15 K/UL
EOSINOPHIL NFR BLD AUTO: 2.6 %
EPITHELIAL CELLS: 6 /HPF
ESTIMATED AVERAGE GLUCOSE: 94 MG/DL
GLUCOSE QUALITATIVE U: NEGATIVE MG/DL
GLUCOSE SERPL-MCNC: 135 MG/DL
HBA1C MFR BLD HPLC: 4.9 %
HCT VFR BLD CALC: 29.7 %
HDLC SERPL-MCNC: 71 MG/DL
HGB BLD-MCNC: 9.5 G/DL
IMM GRANULOCYTES NFR BLD AUTO: 0.4 %
KETONES URINE: NEGATIVE MG/DL
LDLC SERPL-MCNC: 98 MG/DL
LEUKOCYTE ESTERASE URINE: ABNORMAL
LYMPHOCYTES # BLD AUTO: 1.68 K/UL
LYMPHOCYTES NFR BLD AUTO: 29.4 %
MAN DIFF?: NORMAL
MCHC RBC-ENTMCNC: 22.6 PG
MCHC RBC-ENTMCNC: 32 G/DL
MCV RBC AUTO: 70.7 FL
MICROSCOPIC-UA: NORMAL
MONOCYTES # BLD AUTO: 0.6 K/UL
MONOCYTES NFR BLD AUTO: 10.5 %
NEUTROPHILS # BLD AUTO: 3.21 K/UL
NEUTROPHILS NFR BLD AUTO: 56.2 %
NITRITE URINE: NEGATIVE
NONHDLC SERPL-MCNC: 119 MG/DL
PH URINE: 8
PLATELET # BLD AUTO: 295 K/UL
POTASSIUM SERPL-SCNC: 5.2 MMOL/L
PROT SERPL-MCNC: 6.8 G/DL
PROTEIN URINE: NEGATIVE MG/DL
RBC # BLD: 4.2 M/UL
RBC # FLD: 17.1 %
RED BLOOD CELLS URINE: 20 /HPF
SODIUM SERPL-SCNC: 140 MMOL/L
SPECIFIC GRAVITY URINE: 1.01
TRIGL SERPL-MCNC: 119 MG/DL
TSH SERPL-ACNC: 1.76 UIU/ML
UROBILINOGEN URINE: 1 MG/DL
WBC # FLD AUTO: 5.71 K/UL
WHITE BLOOD CELLS URINE: 0 /HPF

## 2025-04-28 PROCEDURE — 99396 PREV VISIT EST AGE 40-64: CPT | Mod: 25

## 2025-04-28 PROCEDURE — 99213 OFFICE O/P EST LOW 20 MIN: CPT | Mod: 25
